# Patient Record
Sex: MALE | Race: WHITE | Employment: FULL TIME | URBAN - METROPOLITAN AREA
[De-identification: names, ages, dates, MRNs, and addresses within clinical notes are randomized per-mention and may not be internally consistent; named-entity substitution may affect disease eponyms.]

---

## 2020-05-24 ENCOUNTER — HOSPITAL ENCOUNTER (OUTPATIENT)
Age: 45
Discharge: HOME | End: 2020-05-24
Attending: EMERGENCY MEDICINE
Payer: COMMERCIAL

## 2020-05-24 VITALS
BODY MASS INDEX: 37.22 KG/M2 | RESPIRATION RATE: 16 BRPM | OXYGEN SATURATION: 100 % | TEMPERATURE: 98 F | HEIGHT: 70 IN | SYSTOLIC BLOOD PRESSURE: 143 MMHG | DIASTOLIC BLOOD PRESSURE: 78 MMHG | HEART RATE: 83 BPM | WEIGHT: 260 LBS

## 2020-05-24 DIAGNOSIS — L03.116 CELLULITIS OF LEFT LOWER EXTREMITY: Primary | ICD-10-CM

## 2020-05-24 PROCEDURE — 99204 OFFICE O/P NEW MOD 45 MIN: CPT

## 2020-05-24 PROCEDURE — 99203 OFFICE O/P NEW LOW 30 MIN: CPT

## 2020-05-24 RX ORDER — CLINDAMYCIN HYDROCHLORIDE 300 MG/1
300 CAPSULE ORAL 3 TIMES DAILY
Qty: 30 CAPSULE | Refills: 0 | Status: SHIPPED | OUTPATIENT
Start: 2020-05-24 | End: 2020-06-03

## 2020-05-24 NOTE — ED PROVIDER NOTES
Patient Seen in: [facility] In [address] [place] [address]      History   Patient presents with:  Cellulitis    Stated Complaint: Leg pain    HPI    Patient is a 49-year-old male with past history of atrial fibrillation, cellulitis of the left lower e swelling or tenderness  Eyes: Nonicteric sclera, no conjunctival injection  ENT: TMs are clear and flat bilaterally.   There is no posterior pharyngeal erythema  Chest: Clear to auscultation, no tenderness  Cardiovascular: Regular rate and rhythm without mu medications    Clindamycin HCl 300 MG Oral Cap  Take 1 capsule (300 mg total) by mouth 3 (three) times daily for 10 days.   Qty: 30 capsule Refills: 0

## 2020-05-24 NOTE — ED INITIAL ASSESSMENT (HPI)
Pt complaining of left lower tib fib redness and tenderness for 2 days. No trauma  Pt states chills last noc. No fever. Area is red and warm to the touch. Pt is concerned about an infection.   Pt states he was recently dx with afib and was recently take

## 2021-04-13 NOTE — H&P
Name: Latia Joiner  Date: 4/14/2021    Referring Physician: No ref. provider found    Patient presents with:  Consult: for abnormal thyroid labs.        HISTORY OF PRESENT ILLNESS   Latia Joiner is a 39year old male who presents for Patient present urinating  Psychiatric:  no acute distress, anxiety  or depression  Skin: normal moisturized skin    PHYSICAL EXAMINATION:  BP (!) 157/95   Pulse 80   Resp 18   Ht 5' 10\" (1.778 m)   Wt 277 lb (125.6 kg)   BMI 39.75 kg/m²     General Appearance:  Alert, i orientation regarding our services. Greater than 50% of the time was spent in counseling, anticipatory guidance, and coordnation of care. Patient concerns were answered to the best of my knowledge. 4/14/2021  Mona Guajardo MD

## 2021-04-14 ENCOUNTER — OFFICE VISIT (OUTPATIENT)
Dept: ENDOCRINOLOGY CLINIC | Facility: CLINIC | Age: 46
End: 2021-04-14
Payer: COMMERCIAL

## 2021-04-14 VITALS
SYSTOLIC BLOOD PRESSURE: 157 MMHG | WEIGHT: 277 LBS | DIASTOLIC BLOOD PRESSURE: 95 MMHG | HEART RATE: 80 BPM | HEIGHT: 70 IN | BODY MASS INDEX: 39.65 KG/M2 | RESPIRATION RATE: 18 BRPM

## 2021-04-14 DIAGNOSIS — E05.00 GRAVES' EYE DISEASE: Primary | ICD-10-CM

## 2021-04-14 PROCEDURE — 3077F SYST BP >= 140 MM HG: CPT | Performed by: INTERNAL MEDICINE

## 2021-04-14 PROCEDURE — 99205 OFFICE O/P NEW HI 60 MIN: CPT | Performed by: INTERNAL MEDICINE

## 2021-04-14 PROCEDURE — 3008F BODY MASS INDEX DOCD: CPT | Performed by: INTERNAL MEDICINE

## 2021-04-14 PROCEDURE — 3080F DIAST BP >= 90 MM HG: CPT | Performed by: INTERNAL MEDICINE

## 2021-04-16 ENCOUNTER — TELEPHONE (OUTPATIENT)
Dept: ENDOCRINOLOGY CLINIC | Facility: CLINIC | Age: 46
End: 2021-04-16

## 2021-04-16 DIAGNOSIS — E05.90 HYPERTHYROIDISM: Primary | ICD-10-CM

## 2021-04-19 NOTE — TELEPHONE ENCOUNTER
Hi!  I have reviewed patient's blood tests from 3/31/21):    TSH- <0.01 (0.40-4.50  Total T4- 11.1 (4.9- 10.5)    I would like patient to come in for repeat blood tests. Can we please call him to let him know?  Please explain to him that he needs more blood

## 2021-04-27 ENCOUNTER — TELEPHONE (OUTPATIENT)
Dept: ENDOCRINOLOGY CLINIC | Facility: CLINIC | Age: 46
End: 2021-04-27

## 2021-04-27 DIAGNOSIS — E05.00 GRAVES DISEASE: Primary | ICD-10-CM

## 2021-04-27 NOTE — TELEPHONE ENCOUNTER
Dr. Yanick Delatorre called in to speak directly to Dr. Jong Mora about this pt she has an update.  Please follow up

## 2021-04-30 NOTE — TELEPHONE ENCOUNTER
Spoke to pt---patient is going to [facility] to have blood work done and will follow up with us once completed

## 2021-04-30 NOTE — TELEPHONE ENCOUNTER
Hi!  I spoke with Dr. Richmond Oseguera regarding this patient and she agrees that this patient most likely has Graves' Ophthalmopathy. Its very important that he has his blood tests done. Can we call him again and ask him to have his blood work done. I would like to start him on the medication. Thank you!

## 2021-05-21 NOTE — TELEPHONE ENCOUNTER
LM for patient to verify labs done at [address] [place] [address] - no results received - patient has f/u with Dr. Ion Castillo on 5/26/21

## 2021-07-10 ENCOUNTER — OFFICE VISIT (OUTPATIENT)
Dept: ENDOCRINOLOGY CLINIC | Facility: CLINIC | Age: 46
End: 2021-07-10
Payer: COMMERCIAL

## 2021-07-10 VITALS — BODY MASS INDEX: 40.52 KG/M2 | WEIGHT: 283 LBS | HEIGHT: 70 IN

## 2021-07-10 DIAGNOSIS — E05.00 GRAVES' EYE DISEASE: ICD-10-CM

## 2021-07-10 DIAGNOSIS — E05.90 HYPERTHYROIDISM: ICD-10-CM

## 2021-07-10 DIAGNOSIS — E05.00 GRAVES DISEASE: Primary | ICD-10-CM

## 2021-07-10 PROCEDURE — 3008F BODY MASS INDEX DOCD: CPT | Performed by: INTERNAL MEDICINE

## 2021-07-10 PROCEDURE — 99214 OFFICE O/P EST MOD 30 MIN: CPT | Performed by: INTERNAL MEDICINE

## 2021-07-10 RX ORDER — METHIMAZOLE 5 MG/1
5 TABLET ORAL DAILY
Qty: 90 TABLET | Refills: 0 | Status: SHIPPED | OUTPATIENT
Start: 2021-07-10 | End: 2022-01-06

## 2021-07-10 RX ORDER — METHIMAZOLE 10 MG/1
10 TABLET ORAL DAILY
Qty: 90 TABLET | Refills: 0 | Status: SHIPPED | OUTPATIENT
Start: 2021-07-10 | End: 2021-07-10

## 2021-07-10 NOTE — PROGRESS NOTES
Name: Abundio Fowler  Date: 7/10/21    Referring Physician: No ref. provider found    Patient presents with:  Thyroid Problem: follow up       [address] is a 55year old male who presents for Patient presents with:   Amelia Martinez pain or arthralgia  /Gyne: no frequency or discomfort while urinating  Psychiatric:  no acute distress, anxiety  or depression  Skin: normal moisturized skin    PHYSICAL EXAMINATION:   07/10/21  1130   Weight: 283 lb (128.4 kg)   Height: 5' 10\" (1.778 m

## 2021-07-21 ENCOUNTER — TELEPHONE (OUTPATIENT)
Dept: ENDOCRINOLOGY CLINIC | Facility: CLINIC | Age: 46
End: 2021-07-21

## 2021-07-22 NOTE — TELEPHONE ENCOUNTER
Spoke to patient to clarify, per LOV dtd 7/10/21,   Start MMI 5mg PO qday    Patient stated pharmacy gave him RX for MMI 5mg and MMI 10mg (10mg RX has been discontinued)    Patient stated understanding to repeat labs in 6 weeks

## 2021-10-04 DIAGNOSIS — E05.90 HYPERTHYROIDISM: ICD-10-CM

## 2021-10-04 DIAGNOSIS — E05.00 GRAVES' EYE DISEASE: ICD-10-CM

## 2021-10-04 DIAGNOSIS — E05.00 GRAVES DISEASE: ICD-10-CM

## 2021-10-04 RX ORDER — METHIMAZOLE 10 MG/1
TABLET ORAL
Qty: 90 TABLET | Refills: 0 | OUTPATIENT
Start: 2021-10-04

## 2021-10-09 ENCOUNTER — OFFICE VISIT (OUTPATIENT)
Dept: ENDOCRINOLOGY CLINIC | Facility: CLINIC | Age: 46
End: 2021-10-09
Payer: COMMERCIAL

## 2021-10-09 VITALS
BODY MASS INDEX: 40.52 KG/M2 | SYSTOLIC BLOOD PRESSURE: 129 MMHG | DIASTOLIC BLOOD PRESSURE: 86 MMHG | WEIGHT: 283 LBS | HEART RATE: 84 BPM | RESPIRATION RATE: 16 BRPM | HEIGHT: 70 IN

## 2021-10-09 DIAGNOSIS — E05.00 GRAVES DISEASE: Primary | ICD-10-CM

## 2021-10-09 DIAGNOSIS — E05.00 GRAVES' EYE DISEASE: ICD-10-CM

## 2021-10-09 DIAGNOSIS — E05.00 THYROID EYE DISEASE: ICD-10-CM

## 2021-10-09 DIAGNOSIS — E05.90 HYPERTHYROIDISM: ICD-10-CM

## 2021-10-09 PROBLEM — H57.89 THYROID EYE DISEASE: Status: ACTIVE | Noted: 2021-10-09

## 2021-10-09 PROBLEM — E07.9 THYROID EYE DISEASE: Status: ACTIVE | Noted: 2021-10-09

## 2021-10-09 PROCEDURE — 3074F SYST BP LT 130 MM HG: CPT | Performed by: INTERNAL MEDICINE

## 2021-10-09 PROCEDURE — 3079F DIAST BP 80-89 MM HG: CPT | Performed by: INTERNAL MEDICINE

## 2021-10-09 PROCEDURE — 99214 OFFICE O/P EST MOD 30 MIN: CPT | Performed by: INTERNAL MEDICINE

## 2021-10-09 PROCEDURE — 3008F BODY MASS INDEX DOCD: CPT | Performed by: INTERNAL MEDICINE

## 2021-10-09 RX ORDER — METHIMAZOLE 5 MG/1
5 TABLET ORAL DAILY
COMMUNITY

## 2021-10-09 NOTE — PROGRESS NOTES
Name: Mki Paul  Date: 10/09/21    Referring Physician: No ref. provider found    Patient presents with: Follow - Up: for graves      HISTORY OF PRESENT ILLNESS   Mik Paul is a 55year old male who presents for Patient presents with:   Etheleen Schaumann wheezing or MANN  Cardiovascular:  no chest pain or palpitations  Gastrointestinal:  no abdominal pain, bowel movement problems  Musculoskeletal: no muscle pain or arthralgia  /Gyne: no frequency or discomfort while urinating  Psychiatric:  no acute distr Patient concerns were answered to the best of my knowledge. 10/09/21  Mona Hein MD

## 2021-10-13 NOTE — TELEPHONE ENCOUNTER
Hi!  I just saw him in follow up. He will be getting labs done, and then we will adjust his dose possibly.  Thank you1

## 2022-01-05 NOTE — TELEPHONE ENCOUNTER
Called and got no answer. LMTCB. Script pending from October, please see provider encounter below.      Routing to [facility] to follow up

## 2022-01-06 RX ORDER — METHIMAZOLE 5 MG/1
TABLET ORAL
Qty: 90 TABLET | Refills: 0 | Status: SHIPPED | OUTPATIENT
Start: 2022-01-06

## 2023-02-16 ENCOUNTER — EXTERNAL RECORD (OUTPATIENT)
Dept: HEALTH INFORMATION MANAGEMENT | Facility: OTHER | Age: 48
End: 2023-02-16

## 2024-06-13 ENCOUNTER — HOSPITAL ENCOUNTER (OUTPATIENT)
Dept: WOUND CARE | Age: 49
Discharge: STILL ADMITTED OR ADMINISTRATIVE | End: 2024-06-13
Attending: FAMILY MEDICINE

## 2024-06-13 VITALS
OXYGEN SATURATION: 95 % | DIASTOLIC BLOOD PRESSURE: 85 MMHG | RESPIRATION RATE: 16 BRPM | HEART RATE: 80 BPM | TEMPERATURE: 97.7 F | SYSTOLIC BLOOD PRESSURE: 143 MMHG

## 2024-06-13 DIAGNOSIS — L97.929 CHRONIC VENOUS HYPERTENSION W ULCER OF L LOW EXTREM  (CMD): Primary | ICD-10-CM

## 2024-06-13 DIAGNOSIS — L97.312 SKIN ULCER OF RIGHT ANKLE WITH FAT LAYER EXPOSED  (CMD): ICD-10-CM

## 2024-06-13 DIAGNOSIS — F17.200 VAPING NICOTINE DEPENDENCE, NON-TOBACCO PRODUCT: ICD-10-CM

## 2024-06-13 DIAGNOSIS — I87.312 CHRONIC VENOUS HYPERTENSION W ULCER OF L LOW EXTREM  (CMD): Primary | ICD-10-CM

## 2024-06-13 DIAGNOSIS — I73.9 PAD (PERIPHERAL ARTERY DISEASE) (CMD): ICD-10-CM

## 2024-06-13 PROCEDURE — 87075 CULTR BACTERIA EXCEPT BLOOD: CPT | Performed by: FAMILY MEDICINE

## 2024-06-13 PROCEDURE — 97597 DBRDMT OPN WND 1ST 20 CM/<: CPT

## 2024-06-13 RX ORDER — DOXYCYCLINE HYCLATE 100 MG
100 TABLET ORAL 2 TIMES DAILY
Qty: 20 TABLET | Refills: 0 | Status: SHIPPED | OUTPATIENT
Start: 2024-06-13 | End: 2024-06-23

## 2024-06-13 RX ORDER — METOPROLOL SUCCINATE 25 MG/1
25 TABLET, EXTENDED RELEASE ORAL DAILY
COMMUNITY

## 2024-06-13 ASSESSMENT — PAIN SCALES - GENERAL: PAINLEVEL_OUTOF10: 10

## 2024-06-14 LAB
BACTERIA SPEC ANAEROBE+AEROBE CULT: ABNORMAL
BACTERIA SPEC ANAEROBE+AEROBE CULT: ABNORMAL
GRAM STN SPEC: ABNORMAL

## 2024-06-17 ENCOUNTER — HOSPITAL ENCOUNTER (OUTPATIENT)
Dept: WOUND CARE | Age: 49
Discharge: STILL ADMITTED OR ADMINISTRATIVE | End: 2024-06-17
Attending: FAMILY MEDICINE

## 2024-06-17 DIAGNOSIS — I87.312 CHRONIC VENOUS HYPERTENSION W ULCER OF L LOW EXTREM  (CMD): Primary | ICD-10-CM

## 2024-06-17 DIAGNOSIS — L97.929 CHRONIC VENOUS HYPERTENSION W ULCER OF L LOW EXTREM  (CMD): Primary | ICD-10-CM

## 2024-06-17 LAB
BACTERIA SPEC ANAEROBE+AEROBE CULT: ABNORMAL
GRAM STN SPEC: ABNORMAL

## 2024-06-17 PROCEDURE — 29581 APPL MULTLAYER CMPRN SYS LEG: CPT

## 2024-06-21 ENCOUNTER — HOSPITAL ENCOUNTER (OUTPATIENT)
Dept: WOUND CARE | Age: 49
Discharge: HOME | End: 2024-06-21
Attending: FAMILY MEDICINE

## 2024-06-21 VITALS
TEMPERATURE: 97.9 F | HEART RATE: 79 BPM | SYSTOLIC BLOOD PRESSURE: 122 MMHG | DIASTOLIC BLOOD PRESSURE: 80 MMHG | RESPIRATION RATE: 18 BRPM

## 2024-06-21 DIAGNOSIS — I87.312 CHRONIC VENOUS HYPERTENSION W ULCER OF L LOW EXTREM  (CMD): Primary | ICD-10-CM

## 2024-06-21 DIAGNOSIS — L97.312 SKIN ULCER OF RIGHT ANKLE WITH FAT LAYER EXPOSED  (CMD): ICD-10-CM

## 2024-06-21 DIAGNOSIS — L97.929 CHRONIC VENOUS HYPERTENSION W ULCER OF L LOW EXTREM  (CMD): Primary | ICD-10-CM

## 2024-06-21 PROCEDURE — 97597 DBRDMT OPN WND 1ST 20 CM/<: CPT

## 2024-06-24 ENCOUNTER — HOSPITAL ENCOUNTER (OUTPATIENT)
Dept: WOUND CARE | Age: 49
Discharge: STILL ADMITTED OR ADMINISTRATIVE | End: 2024-06-24
Attending: FAMILY MEDICINE

## 2024-06-24 DIAGNOSIS — I87.312 CHRONIC VENOUS HYPERTENSION W ULCER OF L LOW EXTREM  (CMD): Primary | ICD-10-CM

## 2024-06-24 DIAGNOSIS — L97.929 CHRONIC VENOUS HYPERTENSION W ULCER OF L LOW EXTREM  (CMD): Primary | ICD-10-CM

## 2024-06-24 PROCEDURE — 29581 APPL MULTLAYER CMPRN SYS LEG: CPT

## 2024-06-28 ENCOUNTER — HOSPITAL ENCOUNTER (OUTPATIENT)
Dept: WOUND CARE | Age: 49
Discharge: HOME | End: 2024-06-28
Attending: FAMILY MEDICINE

## 2024-06-28 DIAGNOSIS — I87.312 CHRONIC VENOUS HYPERTENSION W ULCER OF L LOW EXTREM  (CMD): Primary | ICD-10-CM

## 2024-06-28 DIAGNOSIS — L97.929 CHRONIC VENOUS HYPERTENSION W ULCER OF L LOW EXTREM  (CMD): Primary | ICD-10-CM

## 2024-06-28 DIAGNOSIS — L97.312 SKIN ULCER OF RIGHT ANKLE WITH FAT LAYER EXPOSED  (CMD): ICD-10-CM

## 2024-06-28 PROCEDURE — 99213 OFFICE O/P EST LOW 20 MIN: CPT | Performed by: FAMILY MEDICINE

## 2024-06-28 PROCEDURE — 97597 DBRDMT OPN WND 1ST 20 CM/<: CPT

## 2024-07-02 ENCOUNTER — HOSPITAL ENCOUNTER (OUTPATIENT)
Dept: ULTRASOUND IMAGING | Age: 49
Discharge: HOME | End: 2024-07-02
Attending: FAMILY MEDICINE

## 2024-07-02 DIAGNOSIS — L97.929 CHRONIC VENOUS HYPERTENSION W ULCER OF L LOW EXTREM  (CMD): ICD-10-CM

## 2024-07-02 DIAGNOSIS — I73.9 PAD (PERIPHERAL ARTERY DISEASE) (CMD): ICD-10-CM

## 2024-07-02 DIAGNOSIS — L97.312 SKIN ULCER OF RIGHT ANKLE WITH FAT LAYER EXPOSED  (CMD): ICD-10-CM

## 2024-07-02 DIAGNOSIS — I87.312 CHRONIC VENOUS HYPERTENSION W ULCER OF L LOW EXTREM  (CMD): ICD-10-CM

## 2024-07-02 PROCEDURE — 93971 EXTREMITY STUDY: CPT

## 2024-07-02 PROCEDURE — 93926 LOWER EXTREMITY STUDY: CPT

## 2024-07-03 ENCOUNTER — APPOINTMENT (OUTPATIENT)
Dept: ULTRASOUND IMAGING | Age: 49
End: 2024-07-03
Attending: FAMILY MEDICINE

## 2024-07-05 ENCOUNTER — HOSPITAL ENCOUNTER (OUTPATIENT)
Dept: WOUND CARE | Age: 49
Discharge: HOME | End: 2024-07-05
Attending: FAMILY MEDICINE

## 2024-07-05 VITALS
TEMPERATURE: 97.7 F | OXYGEN SATURATION: 95 % | HEART RATE: 80 BPM | RESPIRATION RATE: 18 BRPM | SYSTOLIC BLOOD PRESSURE: 132 MMHG | DIASTOLIC BLOOD PRESSURE: 84 MMHG

## 2024-07-05 DIAGNOSIS — L97.929 CHRONIC VENOUS HYPERTENSION W ULCER OF L LOW EXTREM  (CMD): Primary | ICD-10-CM

## 2024-07-05 DIAGNOSIS — I87.312 CHRONIC VENOUS HYPERTENSION W ULCER OF L LOW EXTREM  (CMD): Primary | ICD-10-CM

## 2024-07-05 DIAGNOSIS — L97.312 SKIN ULCER OF RIGHT ANKLE WITH FAT LAYER EXPOSED  (CMD): ICD-10-CM

## 2024-07-05 DIAGNOSIS — I87.2 VENOUS REFLUX: ICD-10-CM

## 2024-07-05 PROCEDURE — 99213 OFFICE O/P EST LOW 20 MIN: CPT | Performed by: FAMILY MEDICINE

## 2024-07-05 PROCEDURE — 99213 OFFICE O/P EST LOW 20 MIN: CPT

## 2024-07-05 ASSESSMENT — PAIN SCALES - GENERAL: PAINLEVEL_OUTOF10: 0

## 2024-07-09 ENCOUNTER — APPOINTMENT (OUTPATIENT)
Dept: ULTRASOUND IMAGING | Age: 49
End: 2024-07-09
Attending: FAMILY MEDICINE

## 2024-07-16 ENCOUNTER — HOSPITAL ENCOUNTER (OUTPATIENT)
Dept: WOUND CARE | Age: 49
Discharge: STILL ADMITTED OR ADMINISTRATIVE | End: 2024-07-16
Attending: FAMILY MEDICINE

## 2024-07-16 VITALS
SYSTOLIC BLOOD PRESSURE: 150 MMHG | DIASTOLIC BLOOD PRESSURE: 93 MMHG | RESPIRATION RATE: 16 BRPM | TEMPERATURE: 98.2 F | OXYGEN SATURATION: 97 % | HEART RATE: 82 BPM

## 2024-07-16 DIAGNOSIS — I87.312 CHRONIC VENOUS HYPERTENSION W ULCER OF L LOW EXTREM  (CMD): Primary | ICD-10-CM

## 2024-07-16 DIAGNOSIS — L97.929 CHRONIC VENOUS HYPERTENSION W ULCER OF L LOW EXTREM  (CMD): Primary | ICD-10-CM

## 2024-07-16 PROCEDURE — 29581 APPL MULTLAYER CMPRN SYS LEG: CPT

## 2024-07-16 ASSESSMENT — PAIN SCALES - GENERAL: PAINLEVEL_OUTOF10: 0

## 2024-08-22 ENCOUNTER — APPOINTMENT (OUTPATIENT)
Dept: CARDIOLOGY | Age: 49
End: 2024-08-22

## 2024-10-24 ENCOUNTER — APPOINTMENT (OUTPATIENT)
Dept: CARDIOLOGY | Age: 49
End: 2024-10-24
Attending: FAMILY MEDICINE

## 2024-10-24 VITALS
WEIGHT: 270.28 LBS | HEIGHT: 70 IN | TEMPERATURE: 97.6 F | SYSTOLIC BLOOD PRESSURE: 130 MMHG | BODY MASS INDEX: 38.69 KG/M2 | HEART RATE: 75 BPM | DIASTOLIC BLOOD PRESSURE: 83 MMHG | RESPIRATION RATE: 18 BRPM

## 2024-10-24 DIAGNOSIS — Z91.89 CARDIOVASCULAR RISK FACTOR: ICD-10-CM

## 2024-10-24 DIAGNOSIS — I87.312 CHRONIC VENOUS HYPERTENSION W ULCER OF L LOW EXTREM  (CMD): Primary | ICD-10-CM

## 2024-10-24 DIAGNOSIS — L97.929 CHRONIC VENOUS HYPERTENSION W ULCER OF L LOW EXTREM  (CMD): Primary | ICD-10-CM

## 2024-10-24 PROCEDURE — 99205 OFFICE O/P NEW HI 60 MIN: CPT | Performed by: INTERNAL MEDICINE

## 2024-10-24 RX ORDER — DICLOFENAC SODIUM 75 MG/1
75 TABLET, DELAYED RELEASE ORAL 2 TIMES DAILY
COMMUNITY

## 2024-10-31 ENCOUNTER — APPOINTMENT (OUTPATIENT)
Dept: CARDIOLOGY | Age: 49
End: 2024-10-31
Attending: INTERNAL MEDICINE

## 2025-01-12 ENCOUNTER — WALK IN (OUTPATIENT)
Dept: URGENT CARE | Age: 50
End: 2025-01-12
Attending: FAMILY MEDICINE

## 2025-01-12 VITALS
HEART RATE: 96 BPM | OXYGEN SATURATION: 96 % | SYSTOLIC BLOOD PRESSURE: 134 MMHG | TEMPERATURE: 98.5 F | RESPIRATION RATE: 18 BRPM | DIASTOLIC BLOOD PRESSURE: 91 MMHG

## 2025-01-12 DIAGNOSIS — K52.9 GASTROENTERITIS: ICD-10-CM

## 2025-01-12 DIAGNOSIS — R10.9 ABDOMINAL PAIN, UNSPECIFIED ABDOMINAL LOCATION: Primary | ICD-10-CM

## 2025-01-12 LAB
FLUAV AG UPPER RESP QL IA.RAPID: NEGATIVE
FLUBV AG UPPER RESP QL IA.RAPID: NEGATIVE
SARS-COV+SARS-COV-2 AG RESP QL IA.RAPID: NOT DETECTED
TEST LOT EXPIRATION DATE: NORMAL
TEST LOT NUMBER: NORMAL

## 2025-01-12 PROCEDURE — 87428 SARSCOV & INF VIR A&B AG IA: CPT | Performed by: NURSE PRACTITIONER

## 2025-01-12 RX ORDER — METOPROLOL SUCCINATE 25 MG/1
25 TABLET, EXTENDED RELEASE ORAL DAILY
COMMUNITY
Start: 2024-11-17

## 2025-01-12 ASSESSMENT — ENCOUNTER SYMPTOMS
NAUSEA: 1
RESPIRATORY NEGATIVE: 1
EYES NEGATIVE: 1
ENDOCRINE NEGATIVE: 1
SINUS PRESSURE: 0
ALLERGIC/IMMUNOLOGIC NEGATIVE: 1
FEVER: 1
SORE THROAT: 0
HEADACHES: 0
HEMATOLOGIC/LYMPHATIC NEGATIVE: 1
SINUS PAIN: 0
RHINORRHEA: 0
NEUROLOGICAL NEGATIVE: 1
PSYCHIATRIC NEGATIVE: 1
ABDOMINAL PAIN: 1
VOMITING: 1
DIZZINESS: 0

## 2025-01-12 ASSESSMENT — PAIN SCALES - GENERAL: PAINLEVEL: 8
